# Patient Record
Sex: MALE | Race: BLACK OR AFRICAN AMERICAN | Employment: UNEMPLOYED | URBAN - METROPOLITAN AREA
[De-identification: names, ages, dates, MRNs, and addresses within clinical notes are randomized per-mention and may not be internally consistent; named-entity substitution may affect disease eponyms.]

---

## 2022-02-12 ENCOUNTER — APPOINTMENT (OUTPATIENT)
Dept: GENERAL RADIOLOGY | Facility: CLINIC | Age: 22
End: 2022-02-12
Attending: EMERGENCY MEDICINE

## 2022-02-12 ENCOUNTER — HOSPITAL ENCOUNTER (EMERGENCY)
Facility: CLINIC | Age: 22
Discharge: HOME | End: 2022-02-12
Attending: EMERGENCY MEDICINE | Admitting: EMERGENCY MEDICINE

## 2022-02-12 VITALS
HEART RATE: 78 BPM | RESPIRATION RATE: 20 BRPM | TEMPERATURE: 97.4 F | SYSTOLIC BLOOD PRESSURE: 122 MMHG | DIASTOLIC BLOOD PRESSURE: 81 MMHG | OXYGEN SATURATION: 100 %

## 2022-02-12 DIAGNOSIS — R13.10 DYSPHAGIA, UNSPECIFIED TYPE: ICD-10-CM

## 2022-02-12 DIAGNOSIS — K21.00 GASTROESOPHAGEAL REFLUX DISEASE WITH ESOPHAGITIS WITHOUT HEMORRHAGE: ICD-10-CM

## 2022-02-12 PROCEDURE — 250N000013 HC RX MED GY IP 250 OP 250 PS 637: Performed by: EMERGENCY MEDICINE

## 2022-02-12 PROCEDURE — 99284 EMERGENCY DEPT VISIT MOD MDM: CPT

## 2022-02-12 PROCEDURE — 70360 X-RAY EXAM OF NECK: CPT

## 2022-02-12 PROCEDURE — 250N000009 HC RX 250: Performed by: EMERGENCY MEDICINE

## 2022-02-12 PROCEDURE — 74220 X-RAY XM ESOPHAGUS 1CNTRST: CPT

## 2022-02-12 PROCEDURE — 255N000001 HC RX 255: Performed by: EMERGENCY MEDICINE

## 2022-02-12 RX ORDER — FAMOTIDINE 20 MG/1
20 TABLET, FILM COATED ORAL 2 TIMES DAILY
Qty: 20 TABLET | Refills: 0 | Status: SHIPPED | OUTPATIENT
Start: 2022-02-12 | End: 2022-02-22

## 2022-02-12 RX ORDER — ALUMINUM HYDROXIDE AND MAGNESIUM CARBONATE 254; 237.5 MG/5ML; MG/5ML
2-4 LIQUID ORAL 4 TIMES DAILY PRN
Qty: 355 ML | Refills: 0 | Status: SHIPPED | OUTPATIENT
Start: 2022-02-12

## 2022-02-12 RX ADMIN — ANTACID/ANTIFLATULENT 4 G: 380; 550; 10; 10 GRANULE, EFFERVESCENT ORAL at 13:55

## 2022-02-12 RX ADMIN — LIDOCAINE HYDROCHLORIDE 30 ML: 20 SOLUTION ORAL; TOPICAL at 12:29

## 2022-02-12 ASSESSMENT — ENCOUNTER SYMPTOMS
DIARRHEA: 0
RHINORRHEA: 0
VOICE CHANGE: 0
VOMITING: 0
SHORTNESS OF BREATH: 0
FACIAL SWELLING: 0
COUGH: 0
ACTIVITY CHANGE: 0
ABDOMINAL PAIN: 0
FATIGUE: 0
CONFUSION: 0
APPETITE CHANGE: 0
NAUSEA: 0
FEVER: 0
TROUBLE SWALLOWING: 1
SORE THROAT: 0

## 2022-02-12 NOTE — ED TRIAGE NOTES
Pt arrives with c/o throat feeling swollen which initially started 6 days ago and worsened about 2 days ago. Pt was seen at  yesterday, strep negative, mono negative per patient, pt started on abx but reports symptoms have persisted. ABCs intact.

## 2022-02-12 NOTE — ED PROVIDER NOTES
History     Chief Complaint:  Pharyngitis       HPI   Arnaldo Ogden is a 21 year old male who presents for evaluation of 6 days of subjective feeling that his throat is swollen and difficulty swallowing.  He denies any throat pain or pain with swallowing.  No URI symptoms, cough.  He denies any recent fever.  No soft tissue swelling or swelling of the neck or facial structures.  He reports that he has been belching more frequently and has felt bile, up into the back of his throat.  He notes that using ginger ale and belching helps to relieve his symptoms.  He has no difficulty swallowing liquids but notes that he feels that it is more difficult to swallow food.  No change in voice.  No drooling.  He denies any episode of something getting stuck in his throat.  He denies any injury to the throat.  He has not otherwise felt ill.  No abdominal pain.  No nausea or vomiting.  Patient was evaluated at urgent care yesterday and was prescribed Augmentin.  He reportedly had a negative strep and mono tests.     ROS:  Review of Systems   Constitutional: Negative for activity change, appetite change, fatigue and fever.   HENT: Positive for trouble swallowing. Negative for congestion, dental problem, drooling, facial swelling, postnasal drip, rhinorrhea, sore throat and voice change.    Respiratory: Negative for cough and shortness of breath.    Cardiovascular: Negative for chest pain.   Gastrointestinal: Negative for abdominal pain, diarrhea, nausea and vomiting.   Skin: Negative for rash.   Neurological: Negative for syncope.   Psychiatric/Behavioral: Negative for confusion.   All other systems reviewed and are negative.       Allergies:  No Known Allergies     Medications:    Patient denies taking any medications.    Past Medical History:    Patient denies any past medical history.    Past Surgical History:    Patient denies any past surgical history.    Family History:    Noncontributory.    Social History:  Patient  presents to the emergency department alone.  He denies any tobacco use but reports that he occasionally smokes marijuana.  No significant alcohol use.    Physical Exam     Patient Vitals for the past 24 hrs:   BP Temp Temp src Pulse Resp SpO2   02/12/22 1448 -- -- -- -- -- 100 %   02/12/22 1349 122/81 -- -- 78 -- 98 %   02/12/22 1201 138/75 97.4  F (36.3  C) Oral 89 20 100 %        Physical Exam  General: Well appearing, nontoxic. Resting comfortably  Head:  Scalp, face, and head appear normal  Eyes:  Pupils are equal, round    Conjunctivae non-injected and sclerae white  ENT:    The external nose is normal    MMM. Posterior pharynx clear without swelling, exudates or erythema. No mucosal lesions, tongue or lip swelling. No tongue elevation. Uvula normal without deviation. Voice normal. No drooling or trismus.  Tonsils are normal without swelling.  Posterior pharynx is clear.  Dentition is normal. No sublingual swelling. Tip of the epiglottis is visualized and normal without edema or swelling.     Pinnae are normal  Neck:  Normal range of motion.  Soft tissues of the neck are normal without swelling, erythema.  No cervical lymphadenopathy.    There is no rigidity noted    Trachea is in the midline  CV:  Regular rate and rhythm     Normal S1/S2, no S3/S4    No murmur or rub. Radial pulses 2+ bilaterally.  Resp:  Lungs are clear and equal bilaterally  There is no tachypnea    No increased work of breathing    No rales, wheezing, or rhonchi  GI:  Abdomen is soft, no rigidity or guarding    No distension, or mass    No tenderness or rebound tenderness   MS:  Normal muscular tone  Skin:  No rash or acute skin lesions noted  Neuro: Awake and alert  Speech is normal and fluent  Moves all extremities spontaneously  Psych:  Normal affect. Appropriate interactions.      Emergency Department Course     Imaging:  XR Esophagram   Final Result   IMPRESSION:   1.  Negative esophagram.         Neck soft tissue XR   Final Result    IMPRESSION: Prevertebral soft tissues within normal limits for thickness. The epiglottis is thin and intact. Pharyngeal and tracheal air columns are maintained. Nonspecific mild straightening of the cervical lordosis.         Report per radiology    Laboratory:  Labs Ordered and Resulted from Time of ED Arrival to Time of ED Departure - No data to display     Procedures   None    Emergency Department Course:             Reviewed:  I reviewed nursing notes, vitals and past medical history    Assessments:   I obtained history and examined the patient as noted above.    I rechecked the patient and explained findings.       Consults:   None    Interventions:  Medications   lidocaine (viscous) (XYLOCAINE) 2 % 15 mL, alum & mag hydroxide-simethicone (MAALOX) 15 mL GI Cocktail (30 mLs Oral Given 2/12/22 1229)   barium sulfate (EZ-HD) oral suspension 98% (20 mLs Oral Given 2/12/22 1354)   sod bicarbonate-citric acid-simethicone (EZ GAS) 2.21-1.53-0.04 g packet 4 g (4 g Oral Given 2/12/22 1355)        Disposition:  The patient was discharged to home.     Impression & Plan      Covid-19  Arnaldo Ogden was evaluated during a global COVID-19 pandemic, which necessitated consideration that the patient might be at risk for infection with the SARS-CoV-2 virus that causes COVID-19.   Applicable protocols for evaluation were followed during the patient's care.   COVID-19 was considered as part of the patient's evaluation.    Medical Decision Making:  Agustin Ogden is a 21 year old male who is otherwise healthy and presents to the emergency department with dysphagia.  He denies any sore throat, neck pain or pain with swallowing.  He simply notes that it feels like it is difficult to swallow things and has had increasing belching and sour taste in his mouth.  On my evaluation he is well-appearing, hemodynamically stable and afebrile.  The oropharyngeal exam is normal without evidence of pharyngitis.  The tip of his  epiglottis is easily visualized on exam and appears normal.  No evidence of deep space soft tissue infection of the face or neck.  No clinical evidence to suggest peritonsillar abscess, retropharyngeal abscess, Adair's angina.  Patient has recently had a negative strep and mono test at urgent care.  Patient was started on Augmentin for unclear reasons.  His clinical exam and symptoms are not consistent with acute bacterial pharyngitis.  Soft tissue radiographs of the neck were obtained and normal.  Given his symptoms and esophagram was obtained and thankfully also appeared normal.  There was a delay in receiving the radiology results of the patient's esophagram and he requested to be discharged before this resulted.  I called the patient and discussed the findings of the esophagram when it resulted.  Signs and symptoms are likely due to gastroesophageal reflux disease with esophageal/pharyngeal irritation.  I recommended treatment with famotidine and Gaviscon.  If symptoms do not improve or continue patient should follow-up closely with gastroenterology.  I discussed with the patient that based on my evaluation there is no evidence of an acute infectious process and he may discontinue his antibiotics.  The patient was agreeable with the plan of care.  Close return precautions were provided he was discharged in stable condition.    Diagnosis:    ICD-10-CM    1. Dysphagia, unspecified type  R13.10    2. Gastroesophageal reflux disease with esophagitis without hemorrhage  K21.00         Discharge Medications:  Famotidine  Gaviscon    2/12/2022   Davey Mora MD Daro, Ryan Clay, MD  02/13/22 0922

## 2022-02-12 NOTE — LETTER
February 12, 2022      To Whom It May Concern:      Agustin Ogden was seen in our Emergency Department today, 02/12/22.  He should be excused for this date but may return to work/school when improved.    Sincerely,        Davey Mora MD

## 2022-09-25 ENCOUNTER — OFFICE VISIT (OUTPATIENT)
Dept: URGENT CARE | Facility: URGENT CARE | Age: 22
End: 2022-09-25

## 2022-09-25 VITALS
SYSTOLIC BLOOD PRESSURE: 130 MMHG | WEIGHT: 169 LBS | DIASTOLIC BLOOD PRESSURE: 75 MMHG | TEMPERATURE: 98.6 F | OXYGEN SATURATION: 99 % | HEART RATE: 68 BPM

## 2022-09-25 DIAGNOSIS — K04.7 DENTAL INFECTION: Primary | ICD-10-CM

## 2022-09-25 DIAGNOSIS — K02.9 DENTAL DECAY: ICD-10-CM

## 2022-09-25 DIAGNOSIS — J06.9 VIRAL URI: ICD-10-CM

## 2022-09-25 DIAGNOSIS — K02.9 DENTAL CARIES: ICD-10-CM

## 2022-09-25 PROCEDURE — 99203 OFFICE O/P NEW LOW 30 MIN: CPT | Performed by: PHYSICIAN ASSISTANT

## 2022-09-25 RX ORDER — NAPROXEN 500 MG/1
500 TABLET ORAL 2 TIMES DAILY WITH MEALS
Qty: 40 TABLET | Refills: 0 | Status: SHIPPED | OUTPATIENT
Start: 2022-09-25

## 2022-09-25 RX ORDER — PENICILLIN V POTASSIUM 500 MG/1
500 TABLET, FILM COATED ORAL 4 TIMES DAILY
Qty: 40 TABLET | Refills: 0 | Status: SHIPPED | OUTPATIENT
Start: 2022-09-25 | End: 2022-10-05

## 2022-09-25 ASSESSMENT — ENCOUNTER SYMPTOMS
RHINORRHEA: 1
SORE THROAT: 0
FACIAL SWELLING: 1
VOMITING: 0
DIARRHEA: 0
COUGH: 0
FEVER: 0

## 2022-09-25 NOTE — PROGRESS NOTES
Assessment & Plan:        ICD-10-CM    1. Dental infection  K04.7 penicillin V (VEETID) 500 MG tablet     naproxen (NAPROSYN) 500 MG tablet   2. Dental caries  K02.9    3. Dental decay  K02.9    4. Viral URI  J06.9          Plan/Clinical Decision Making:    Patient with chronic dental issues with flare up of swelling with dental swelling.   Also has had acute onset of URI symptoms vs. Seasonal allergies.   Declines covid testing.  No fever.   Will prescribe PVK course, patient has dental visit on the 15th of Oct.  Can also take Naproxen for pain.       Return in about 5 days (around 9/30/2022), or if symptoms worsen or fail to improve.     At the end of the encounter, I discussed results, diagnosis, medications. Discussed red flags for immediate return to clinic/ER, as well as indications for follow up if no improvement. Patient understood and agreed to plan. Patient was stable for discharge.        Francie Medrano PA-C on 9/25/2022 at 3:44 PM          Subjective:     HPI:    Agustin Benitez is a 22 year old male who presents to clinic today for the following health issues:  Chief Complaint   Patient presents with     Urgent Care     Left side jaw pain/ swollen which started yesterday.     HPI    Patient complains of jaw swelling x 1 day. Having a bit of pain on left jaw.   Patient has dental issues and concerned about infection. Hx of dental caries, root canal and filling fell out in the past. Also runny nose and congestion started yesterday.     History obtained from the patient.    Review of Systems   Constitutional: Negative for fever.   HENT: Positive for congestion, dental problem, facial swelling and rhinorrhea (clear). Negative for ear pain and sore throat.    Respiratory: Negative for cough.    Gastrointestinal: Negative for diarrhea and vomiting.         There is no problem list on file for this patient.       No past medical history on file.    Social History     Tobacco Use     Smoking status: Not on file      Smokeless tobacco: Not on file   Substance Use Topics     Alcohol use: Not on file             Objective:     Vitals:    09/25/22 1516   BP: 130/75   BP Location: Right arm   Patient Position: Sitting   Cuff Size: Adult Regular   Pulse: 68   Temp: 98.6  F (37  C)   TempSrc: Tympanic   SpO2: 99%   Weight: 76.7 kg (169 lb)         Physical Exam   EXAM:   Pleasant, alert, appropriate appearance. NAD.  Head Exam: Normocephalic, atraumatic.  Eye Exam:  non icteric/injection.    Ear Exam: TMs grey without bulging. Normal canals.  Normal pinna.  Nose Exam: Normal external nose.    OroPharynx Exam:  Moist mucous membranes. No erythema of pharynx, pharynx without exudate or hypertrophy. Left lower molars with gum swelling, no drainage, missing filling, no tooth tenderness. Mild decay. Swelling mild left jaw area.   Neck/Thyroid Exam:  No LAD.    Chest/Respiratory Exam: CTAB.  Cardiovascular Exam: RRR. No murmur or rubs.      Results:  No results found for any visits on 09/25/22.